# Patient Record
Sex: FEMALE | Race: WHITE | NOT HISPANIC OR LATINO | Employment: FULL TIME | URBAN - METROPOLITAN AREA
[De-identification: names, ages, dates, MRNs, and addresses within clinical notes are randomized per-mention and may not be internally consistent; named-entity substitution may affect disease eponyms.]

---

## 2017-07-25 RX ORDER — ESTRADIOL 1 MG/1
TABLET ORAL
Qty: 30 TABLET | Refills: 0 | Status: SHIPPED | OUTPATIENT
Start: 2017-07-25

## 2020-06-30 ENCOUNTER — TELEPHONE (OUTPATIENT)
Dept: OBSTETRICS AND GYNECOLOGY | Facility: CLINIC | Age: 40
End: 2020-06-30

## 2020-06-30 NOTE — TELEPHONE ENCOUNTER
----- Message from Eduard Yanez MD sent at 6/29/2020  4:44 PM EDT -----  Ny, this is a back MRI. Not something I typically order. Make sure she has follow up not with us. No visit in last 4 years? Thanks, Dr. Yanez

## 2020-06-30 NOTE — TELEPHONE ENCOUNTER
Spoke to pt, she has an appt with her doctor today to follow up on this MRI.  Pt did not want to schedule with us at this time.     Ny